# Patient Record
Sex: FEMALE | Race: WHITE | NOT HISPANIC OR LATINO
[De-identification: names, ages, dates, MRNs, and addresses within clinical notes are randomized per-mention and may not be internally consistent; named-entity substitution may affect disease eponyms.]

---

## 2017-02-09 ENCOUNTER — RECORDS - HEALTHEAST (OUTPATIENT)
Dept: ADMINISTRATIVE | Facility: OTHER | Age: 82
End: 2017-02-09

## 2017-02-12 ENCOUNTER — RECORDS - HEALTHEAST (OUTPATIENT)
Dept: ADMINISTRATIVE | Facility: OTHER | Age: 82
End: 2017-02-12

## 2017-02-16 ENCOUNTER — OFFICE VISIT - HEALTHEAST (OUTPATIENT)
Dept: FAMILY MEDICINE | Facility: CLINIC | Age: 82
End: 2017-02-16

## 2017-02-16 DIAGNOSIS — J11.00: ICD-10-CM

## 2017-02-16 DIAGNOSIS — I48.0 PAROXYSMAL ATRIAL FIBRILLATION (H): ICD-10-CM

## 2017-02-16 RX ORDER — METOPROLOL TARTRATE 25 MG/1
25 TABLET, FILM COATED ORAL
Status: SHIPPED | COMMUNITY
Start: 2017-02-12

## 2017-02-16 RX ORDER — AMLODIPINE BESYLATE 5 MG/1
5 TABLET ORAL
Status: SHIPPED | COMMUNITY
Start: 2017-02-12

## 2017-02-16 ASSESSMENT — MIFFLIN-ST. JEOR: SCORE: 1109.17

## 2018-03-02 ENCOUNTER — OFFICE VISIT - HEALTHEAST (OUTPATIENT)
Dept: FAMILY MEDICINE | Facility: CLINIC | Age: 83
End: 2018-03-02

## 2018-03-02 DIAGNOSIS — R42 VERTIGO: ICD-10-CM

## 2018-03-02 RX ORDER — MECLIZINE HYDROCHLORIDE 25 MG/1
25 TABLET ORAL 3 TIMES DAILY PRN
Qty: 30 TABLET | Refills: 1 | Status: SHIPPED | OUTPATIENT
Start: 2018-03-02

## 2021-05-30 VITALS — HEIGHT: 61 IN | BODY MASS INDEX: 30.21 KG/M2 | WEIGHT: 160 LBS

## 2021-06-01 VITALS — BODY MASS INDEX: 30.48 KG/M2 | WEIGHT: 160 LBS

## 2021-06-15 PROBLEM — I48.91 ATRIAL FIBRILLATION (H): Status: ACTIVE | Noted: 2017-02-09

## 2021-06-16 NOTE — PROGRESS NOTES
Chief Complaint   Patient presents with     Dizziness     x 3 days          HPI    Patient is here for 3 days of morning dizziness described as room spinning and improves after a few hrs, sometimes going into the afternoon. Currently no dizziness. Symptoms tend to be aggravated by getting up and head movement. No nausea, vomiting, headache, visual disturbances, chest pain, shortness of breath.     ROS: Pertinent ROS noted in HPI.     No Known Allergies    Patient Active Problem List   Diagnosis     Type 2 diabetes mellitus without complication     Essential hypertension with goal blood pressure less than 140/90     Mixed hyperlipidemia     Coronary artery disease involving autologous vein coronary bypass graft without angina pectoris     History of cancer of right breast     Non morbid obesity due to excess calories     Bilateral ocular hypertension     Hearing impairment, bilateral     Atrial fibrillation       Family History   Problem Relation Age of Onset     Breast cancer Mother 65     Survived and  of old age at age 93     Ulcers Father       age 50 one day after surgery for bleeding gastric ulcer.     Alcoholism Father      Brain cancer Sister      Lung cancer Brother      Or throat cancer.  Drinker and smoker.     Alcoholism Brother        Social History     Social History     Marital status:      Spouse name: N/A     Number of children: 11     Years of education: N/A     Occupational History     Not on file.     Social History Main Topics     Smoking status: Current Every Day Smoker     Smokeless tobacco: Never Used      Comment: 4 to 5 cigarettes a day.  Quit for 28 years during pregnancies, then resumed.     Alcohol use Yes      Comment: One beer once a month maximum     Drug use: No     Sexual activity: No     Other Topics Concern     Not on file     Social History Narrative         Objective:    Vitals:    18 1229   BP: 128/78   Pulse: 82   Resp: 18   Temp: 97.7  F (36.5  C)   SpO2:  97%       Gen: well appearing, conversant   Oropharyhnx: clear without lesions  Ears: TMs clear without effusions, ear canals normal with minimal cerumen  Eyes: normal conjunctiva, PERRLA, EOMI, no nystagmus  Neck: supple, full ROM  CV: RRR, normal S1S2, no M, R, G  Pulm: CTAB, normal effort  Neuro: Cranial nerves: Normal facial movements, normal speech.  Tongue is midline.  Normal eye movement.  Shoulder shrug is strong and symmetrical. Norm gait.     Impression:    Vertigo - peripheral likely.    Plan:    Meclizine per EPIC.  Follow up closely in ER if symptoms escalate, otherwise with PCP next week if no improvement.